# Patient Record
Sex: FEMALE | URBAN - METROPOLITAN AREA
[De-identification: names, ages, dates, MRNs, and addresses within clinical notes are randomized per-mention and may not be internally consistent; named-entity substitution may affect disease eponyms.]

---

## 2023-09-24 ENCOUNTER — NURSE TRIAGE (OUTPATIENT)
Dept: ADMINISTRATIVE | Facility: CLINIC | Age: 33
End: 2023-09-24

## 2023-09-25 NOTE — TELEPHONE ENCOUNTER
Reason for Disposition   [1] Numbness (i.e., loss of sensation) of the face, arm / hand, or leg / foot on one side of the body AND [2] sudden onset AND [3] present now    Additional Information   Negative: [1] SEVERE weakness (i.e., unable to walk or barely able to walk, requires support) AND [2] new-onset or worsening   Negative: [1] Weakness (i.e., paralysis, loss of muscle strength) of the face, arm / hand, or leg / foot on one side of the body AND [2] sudden onset AND [3] present now  (Exception: Bell's palsy suspected [i.e., weakness only on one side of the face, developing over hours to days, no other symptoms].)    Protocols used: Neurologic Deficit-A-  Calling from [place]. Spouse called re complicated situation. Pt has stage III cirrhosis. At [facility] 10 days ago. gave lasix and lactulose. yest c/o 'feeling off and eyes fluttering or darting back and forth'- caller states pt only felt like that but caller didn't notice it happening. Pt not as many BMs as usual. Do I think pt should go to [facility] now or can it wait and call pts DENISE camejo in am. Caller states pt is having pinching/tingling on R side. Rec EMS. Caller agrees.